# Patient Record
Sex: MALE | Race: WHITE | NOT HISPANIC OR LATINO | Employment: FULL TIME | URBAN - METROPOLITAN AREA
[De-identification: names, ages, dates, MRNs, and addresses within clinical notes are randomized per-mention and may not be internally consistent; named-entity substitution may affect disease eponyms.]

---

## 2019-08-13 ENCOUNTER — OFFICE VISIT (OUTPATIENT)
Dept: URGENT CARE | Facility: CLINIC | Age: 49
End: 2019-08-13
Payer: COMMERCIAL

## 2019-08-13 VITALS
HEART RATE: 82 BPM | HEIGHT: 72 IN | TEMPERATURE: 97 F | SYSTOLIC BLOOD PRESSURE: 122 MMHG | OXYGEN SATURATION: 98 % | WEIGHT: 200 LBS | DIASTOLIC BLOOD PRESSURE: 72 MMHG | BODY MASS INDEX: 27.09 KG/M2 | RESPIRATION RATE: 18 BRPM

## 2019-08-13 DIAGNOSIS — B02.9 HERPES ZOSTER WITHOUT COMPLICATION: Primary | ICD-10-CM

## 2019-08-13 DIAGNOSIS — M25.562 CHRONIC PAIN OF LEFT KNEE: ICD-10-CM

## 2019-08-13 DIAGNOSIS — G89.29 CHRONIC PAIN OF LEFT KNEE: ICD-10-CM

## 2019-08-13 PROCEDURE — G0382 LEV 3 HOSP TYPE B ED VISIT: HCPCS | Performed by: PHYSICIAN ASSISTANT

## 2019-08-13 RX ORDER — VALACYCLOVIR HYDROCHLORIDE 1 G/1
1000 TABLET, FILM COATED ORAL 3 TIMES DAILY
Qty: 21 TABLET | Refills: 0 | Status: SHIPPED | OUTPATIENT
Start: 2019-08-13 | End: 2019-08-20

## 2019-08-13 RX ORDER — METHYLPREDNISOLONE 4 MG/1
TABLET ORAL
Qty: 1 EACH | Refills: 0 | Status: SHIPPED | OUTPATIENT
Start: 2019-08-13

## 2019-08-13 RX ORDER — BUPROPION HYDROCHLORIDE 300 MG/1
TABLET ORAL
Refills: 1 | COMMUNITY
Start: 2019-08-07

## 2019-08-13 RX ORDER — ALPRAZOLAM 0.5 MG/1
TABLET, EXTENDED RELEASE ORAL
Refills: 0 | COMMUNITY
Start: 2019-07-15

## 2019-08-13 RX ORDER — ATORVASTATIN CALCIUM 40 MG/1
TABLET, FILM COATED ORAL
Refills: 3 | COMMUNITY
Start: 2019-08-10

## 2019-08-13 NOTE — PROGRESS NOTES
[facility]        NAME: Earle Vick is a 53 y o  male  : 1970    MRN: [id number]  DATE: 2019  TIME: 1:24 PM    Assessment and Plan   Herpes zoster without complication [U47 0]  1  Herpes zoster without complication  valACYclovir (VALTREX) 1,000 mg tablet    methylPREDNISolone 4 MG tablet therapy pack   2  Chronic pain of left knee           Patient Instructions     Discussed with patient that steroid will also help knee pain, however he should f/u with his ortho if symptoms persist as this is a chronic issue  Follow up with PCP in 3-5 days  Proceed to  ER if symptoms worsen  Chief Complaint     Chief Complaint   Patient presents with    Rash     C/O painful rash on low back since yesterday   Knee Pain     C/O pain in left knee after twisting his knee yesterday  Pt had an injury to the left knee while in the [facility] and it will occasionally flare up  History of Present Illness       52 y/o M presents for eval of rash on R lower back onset yesterday  States rash started out itchy but now burns and tingles  He denies new meds, detergents, lotions, soaps  Pt does state he has been camping this week  He did have chicken pox as a child  He dnenies any fever or cold or flu sxs    Pt also c/o L knee pain  Review of Systems   Review of Systems   All other systems reviewed and are negative          Current Medications       Current Outpatient Medications:     ALPRAZolam ER (XANAX XR) 0 5 MG 24 hr tablet, TK 1 T PO QD PRN, Disp: , Rfl: 0    atorvastatin (LIPITOR) 40 mg tablet, TK 1 T PO QD, Disp: , Rfl: 3    buPROPion (WELLBUTRIN XL) 300 mg 24 hr tablet, TK 1 T PO QD, Disp: , Rfl: 1    methylPREDNISolone 4 MG tablet therapy pack, Use as directed on package, Disp: 1 each, Rfl: 0    valACYclovir (VALTREX) 1,000 mg tablet, Take 1 tablet (1,000 mg total) by mouth 3 (three) times a day for 7 days, Disp: 21 tablet, Rfl: 0    Current Allergies     Allergies as of 08/13/2019 - Reviewed 08/13/2019   Allergen Reaction Noted    Other  08/13/2019            The following portions of the patient's history were reviewed and updated as appropriate: allergies, current medications, past family history, past medical history, past social history, past surgical history and problem list      Past Medical History:   Diagnosis Date    Hyperlipidemia        Past Surgical History:   Procedure Laterality Date    HAND SURGERY         No family history on file  Medications have been verified  Objective   /72   Pulse 82   Temp (!) 97 °F (36 1 °C) (Tympanic)   Resp 18   Ht 6' (1 829 m)   Wt 90 7 kg (200 lb)   SpO2 98%   BMI 27 12 kg/m²        Physical Exam     Physical Exam   Constitutional: He is oriented to person, place, and time  He appears well-developed and well-nourished  No distress  HENT:   Head: Normocephalic and atraumatic  Right Ear: Tympanic membrane, external ear and ear canal normal    Left Ear: Tympanic membrane, external ear and ear canal normal    Nose: Nose normal    Mouth/Throat: Uvula is midline, oropharynx is clear and moist and mucous membranes are normal    Eyes: Pupils are equal, round, and reactive to light  Conjunctivae and EOM are normal    Cardiovascular: Normal rate, regular rhythm and normal heart sounds  Exam reveals no gallop  No murmur heard  Pulmonary/Chest: Effort normal and breath sounds normal  No accessory muscle usage  No respiratory distress  He has no wheezes  He has no rhonchi  He has no rales  Lymphadenopathy:     He has no cervical adenopathy  Neurological: He is alert and oriented to person, place, and time  No cranial nerve deficit  Skin: Skin is warm, dry and intact  No rash noted  Psychiatric: He has a normal mood and affect  Nursing note and vitals reviewed